# Patient Record
(demographics unavailable — no encounter records)

---

## 2022-07-14 LAB
APPEARANCE: CLEAR
BASOPHILS # BLD AUTO: 0 X10E3/MCL (ref 0–0.2)
BASOPHILS NFR BLD AUTO: 0.2 % (ref 0–2)
BILIRUBIN, URINE, POC: NEGATIVE
BLOOD URINE, POC: NEGATIVE
EOSINOPHIL # BLD AUTO: 0.1 X10E3/MCL (ref 0–0.5)
EOSINOPHIL NFR BLD AUTO: 1.3 % (ref 0–7)
ERYTHROCYTE [DISTWIDTH] IN BLOOD BY AUTOMATED COUNT: 13 % (ref 11–16)
GLUCOSE URINE, POC: NEGATIVE MG/DL
HCT VFR BLD AUTO: 40.5 % (ref 34–47)
HGB BLD-MCNC: 13.3 G/DL (ref 11.5–15.7)
IMMATURE GRANS (ABS): 0.04 X10E3/MCL (ref 0–0.06)
IMMATURE GRANULOCYTES: 0.5 % (ref 0–0.6)
KETONES, URINE, POC: NEGATIVE MG/DL
LEUKOCYTE EST, POC: NEGATIVE
LYMPHOCYTES # SPEC AUTO: 1.6 X10E3/MCL (ref 1–3.2)
LYMPHOCYTES NFR BLD AUTO: 18.7 % (ref 15–45)
MCH RBC QN AUTO: 30.6 PG (ref 27–34.5)
MCHC RBC AUTO-ENTMCNC: 32.8 G/DL (ref 32–36)
MCV RBC AUTO: 93.3 FL (ref 81–99)
MONOCYTES # BLD AUTO: 0.5 X10E3/MCL (ref 0.3–1)
MONOCYTES NFR BLD AUTO: 5.8 % (ref 4–12)
NEUTROPHILS # BLD AUTO: 6.4 X10E3/MCL (ref 1.6–7.3)
NEUTROPHILS NFR BLD AUTO: 73.5 % (ref 42–74)
NITRATE, URINE POC: NEGATIVE
PH, URINE, POC: 5.5 (ref 4.5–8)
PLATELET # BLD AUTO: 277 X10E3/MCL (ref 140–440)
PMV BLD AUTO: 10.5 FL (ref 7.2–13.2)
PREGNANCY TEST URINE, POC: NEGATIVE
PROTEIN,URINE, POC: NEGATIVE
RBC # BLD AUTO: 4.34 X10E6/MCL (ref 3.6–5.2)
SPECIFIC GRAVITY, URINE, POC: >=1.03 (ref 1–1.03)
URINALYSIS COLOR, POC: YELLOW
UROBILIN U POC: 0.2 EU/DL
WBC # BLD AUTO: 8.7 X10E3/MCL (ref 3.8–10.6)

## 2022-10-17 NOTE — ED NOTES
ED Patient Summary       ;          [facility] [place]  [address], [address]  [phone]  Discharge Instructions (Patient)  Marlon Jenkins  :  1999                   MRN: [id number]                   FIN: [id number]  Reason For Visit: Nausea; N/V  Final Diagnosis: Anemia; Viral syndrome     Visit Date: 2022 09:43:00  [place] [address] [place] [id number]  Phone: [phone]     Emergency Department Providers:         Primary Physician:      Emely Harrell would like to thank you for allowing us to assist you with your healthcare needs. The following includes patient education materials and information regarding your injury/illness. Follow-up Instructions: You were seen today on an emergency basis. Please contact your primary care doctor for a follow up appointment. If you received a referral to a specialist doctor, it is important you follow-up as instructed. It is important that you call your follow-up doctor to schedule and confirm the location of your next appointment. Your doctor may practice at multiple locations. The office location of your follow-up appointment may be different to the one written on your discharge instructions. If you do not have a primary care doctor, please call [phone] for help in finding a [facility]. [facility] Provider. For help in finding a specialist doctor, please call [phone]. If your condition gets worse before your follow-up with your primary care doctor or specialist, please return to the Emergency Department. Coronavirus 2019 (COVID-19) Reminders:     Patients age 15 - 24, with parental consent, and over age 25 can make an appointment for a COVID-19 vaccine. Patients can contact their [facility] doctors' offices to schedule an appointment to receive the COVID-19 vaccine.  Patients who do not have a [facility] physician can call [phone] to schedule vaccination appointments. Follow Up Appointments:  Primary Care Provider:     Name: PCP,  NONE     Phone:                  With: Address: When:   Follow up with primary care provider  Within 1 week   Comments:   Return to ED if symptoms worsen              [address] SERVICES%>             New Medications  Printed Prescriptions  ondansetron (Zofran 4 mg oral tablet) 1 Tabs Oral (given by mouth) every 4 hours as needed nausea for 3 Days. Refills: 0. Last Dose:____________________  Medications that have not changed  Other Medications  naproxen (naproxen 500 mg oral tablet) 1 Tabs Oral (given by mouth) 2 times a day. Last Dose:____________________  tiZANidine (tiZANidine 4 mg oral tablet) 1 Tabs Oral (given by mouth) every 8 hours. Last Dose:____________________      Allergy Info: penicillin     Discharge Additional Information          Discharge Patient 07/14/22 10:46:00 EDT      Patient Education Materials:        Contusion    A contusion is a deep bruise. Contusions are the result of a blunt injury to tissues and muscle fibers under the skin. The injury causes bleeding under the skin. The skin overlying the contusion may turn blue, purple, or yellow. Minor injuries will give you a painless contusion, but more severe injuries cause contusions that may stay painful and swollen for a few weeks. Follow these instructions at home:    Pay attention to any changes in your symptoms. Let your health care provider know about them. Take these actions to relieve your pain. Managing pain, stiffness, and swelling       Use resting, icing, applying pressure (compression), and raising (elevating) the injured area. This is often called the RICE strategy. ? Rest the injured area. Return to your normal activities as told by your health care provider.  Ask your health care provider what activities are safe for you.    ? If directed, put ice on the injured area:  ? Put ice in a plastic bag.    ? Place a towel between your skin and the bag.    ? Leave the ice on for 20 minutes, 2?3 times per day. ? If directed, apply light compression to the injured area using an elastic bandage. Make sure the bandage is not wrapped too tightly. Remove and reapply the bandage as directed by your health care provider. ? If possible, raise (elevate) the injured area above the level of your heart while you are sitting or lying down. General instructions     Take over-the-counter and prescription medicines only as told by your health care provider. Keep all follow-up visits as told by your health care provider. This is important. Contact a health care provider if:     Your symptoms do not improve after several days of treatment. Your symptoms get worse. You have difficulty moving the injured area. Get help right away if:     You have severe pain. You have numbness in a hand or foot. Your hand or foot turns pale or cold. Summary     A contusion is a deep bruise. Contusions are the result of a blunt injury to tissues and muscle fibers under the skin. It is treated with rest, ice, compression, and elevation. You may be given over-the-counter medicines for pain. Contact a health care provider if your symptoms do not improve, or get worse. Get help right away if you have severe pain, have numbness, or the area turns pale or cold. This information is not intended to replace advice given to you by your health care provider. Make sure you discuss any questions you have with your health care provider. Document Revised: 08/08/2019 Document Reviewed: 08/08/2019  Simone Patient Education ?  [address] [facility]      ---------------------------------------------------------------------------------------------------------------------  [facility] allows patients to review your COVID and other test results as well as discharge documents from any [facility]. [facility], Emergency Department, surgical center or outpatient lab. Test results are typically available 36 hours after the test is completed. [address] encourages you to self-enroll in the [facility] Patient Portal.     To begin your self-enrollment process, please visit [email or web address] Under [facility], click on Sign up now. NOTE: You must be 16 years and older to use [facility] Self-Enroll online. If you are a parent, caregiver, or guardian; you need an invite to access your childs or dependents health records. To obtain an invite, contact the Medical Records department at [phone] Monday through Friday, 8-4:30, select option 3 . If we receive your call afterhours, we will return your call the next business day. If you have issues trying to create or access your account, contact [employer] at [phone] available 7 days a week 24 hours a day.      Comment:

## 2022-10-17 NOTE — ED NOTES
9/18/2020 ; Life Cycle Status:   Active ; Vocabulary:   SNOMED CT          Diagnoses(Active)    Nausea  Date:   7/14/2022 ; Diagnosis Type:   Reason For Visit ; Confirmation:   Complaint of ; Clinical Dx:   Nausea ; Classification:   Medical ; Clinical Service:   Non-Specified ; Code:   PNED ; Probability:   0 ; Diagnosis Code:   [id number]             -    Procedure History   (As Of: 7/14/2022 10:16:25 EDT)     [facility] Fall Risk Assessment Tool   Hx of falling last 3 months ED Fall :   No   Patient confused or disoriented ED Fall :   No   Patient intoxicated or sedated ED Fall :   No   Patient impaired gait ED Fall :   No   Use a mobility assistance device ED Fall :   No   Patient altered elimination ED Fall :   No   [facility] ED Fall Score :   0    [facility] - 7/14/2022 10:12 EDT   ED Advance Directive   Advance Directive :   No   Eden Alicia RN, Mikaela Ayala - 7/14/2022 10:12 EDT   Med Hx   Medication List   (As Of: 7/14/2022 10:16:25 EDT)   Home Meds    naproxen  :   naproxen ; Status:   Documented ; Ordered As Mnemonic:   naproxen 500 mg oral tablet ; Simple Display Line:   500 mg, 1 tabs, Oral, BID, 60 tabs, 0 Refill(s) ; Catalog Code:   naproxen ; Order Dt/Tm:   7/14/2022 10:16:08 EDT          tiZANidine  :   tiZANidine ; Status:   Documented ; Ordered As Mnemonic:   tiZANidine 4 mg oral tablet ; Simple Display Line:   4 mg, 1 tabs, Oral, q8hr, 90 tabs, 0 Refill(s) ;  Catalog Code:   tiZANidine ; Order Dt/Tm:   7/14/2022 10:16:08 EDT

## 2022-10-17 NOTE — DISCHARGE SUMMARY
ED Clinical Summary                         [facility]  [address], [id number] [phone]           PERSON INFORMATION  Name: Angelica Dangelo Age:  25 Years : 1999   Sex: Female Language: English PCP: PCP,  NONE   Marital Status:  Single Phone: [phone] Med Service: MED-Medicine   MRN:  [id number] Acct# [de-identified] Arrival: 2022 09:43:00   Visit Reason: Nausea; N/V Acuity: 3 LOS: 000 01:15   Address:      [address] [place] [address],[address] [id number]  Diagnosis:      Anemia; Viral syndrome  Printed Prescriptions: Allergies      penicillin (Rash)      Medications Administered During Visit:        Patient Medication List:              naproxen (naproxen 500 mg oral tablet) 1 Tabs Oral (given by mouth) 2 times a day. ondansetron (Zofran 4 mg oral tablet) 1 Tabs Oral (given by mouth) every 4 hours as needed nausea for 3 Days. Refills: 0.  tiZANidine (tiZANidine 4 mg oral tablet) 1 Tabs Oral (given by mouth) every 8 hours. Major Tests and Procedures: The following procedures and tests were performed during your ED visit. COMMONPROCEDURES%>  COMMON PROCEDURESCOMMENTS%>          Laboratory Orders  Name Status Details   . Preg U POC Completed Urine, RT, RT - Routine, Collected, 22 10:14:00 EDT, Nurse collect, 22 10:14:00 /[facility] POC Login   . UA POC Completed Urine, RT, RT - Routine, Collected, 22 10:16:00 EDT, Nurse collect, 22 10:16:00 /[place] POC Login   CBCDIFF Completed Blood, Stat, ST - Stat, 22 10:20:00 EDT, 22 10:20:00 EDT, Nurse collect, EVIE GALO, Print label Y/N               Radiology Orders  No radiology orders were placed.               Patient Care Orders  Name Status Details   Discharge Patient Ordered 22 10:46:00 EDT   ED Assessment Adult Completed 22 9:54:31 EDT, 22 9:54:31 EDT   ED Secondary Triage Completed 22 9:54:31 EDT, 07/14/22 9:54:31 EDT   ED Triage Adult Completed 07/14/22 9:43:49 EDT, 07/14/22 9:43:49 EDT   POC-Urine Dipstick collect Completed 07/14/22 9:49:00 EDT, Once, 07/14/22 9:49:00 EDT   POC-Urine Pregnancy Test collect Completed 07/14/22 9:49:00 EDT, Once, 07/14/22 9:49:00 EDT             PROVIDER INFORMATION               Provider Role Assigned Tish Rowe ED Provider 7/14/2022 09:44:51    Michaela Powell, RN, Jose Bartlett ED Nurse 7/14/2022 10:12:17        Attending Physician:  Jsoselyn Tomlin     Admit Doc  EVIE GALO     Consulting Doc       VITALS INFORMATION  Vital Sign Triage Latest   Temp Oral ORAL_1%>36.9 degC ORAL%>36.9 degC   Temp Temporal TEMPORAL_1%> TEMPORAL%>   Temp Intravascular INTRAVASCULAR_1%> INTRAVASCULAR%>   Temp Axillary AXILLARY_1%> AXILLARY%>   Temp Rectal RECTAL_1%> RECTAL%>   02 Sat 99 % 99 %   Respiratory Rate RATE_1%>16 br/min RATE%>16 br/min   Peripheral Pulse Rate PULSE RATE_1%> PULSE RATE%>   Apical Heart Rate HEART RATE_1%> HEART RATE%>   Blood Pressure BLOOD PRESSURE_1%>/ BLOOD PRESSURE_1%>75 mmHg BLOOD PRESSURE%>134 mmHg / BLOOD PRESSURE%>70 mmHg                 Immunizations      No Immunizations Documented This Visit          DISCHARGE INFORMATION   Discharge Disposition: H Outpt-Sent Home   Discharge Location:    Home   Discharge Date and Time:    7/14/2022 10:58:29   ED Checkout Date and Time:    7/14/2022 10:58:29     DEPART REASON INCOMPLETE INFORMATION               Depart Action Incomplete Reason   Interactive View/I&O Recently assessed               Problems      Active           Rh negative          Body mass index 30+ - obesity (09/18/2020)              Smoking Status      Former smoker, quit more than 30 days ago         PATIENT EDUCATION INFORMATION  Instructions:       Contusion     Follow up:                    With: Address: When:   Follow up with primary care provider  Within 1 week   Comments:   Return to ED if symptoms worsen           ED PROVIDER DOCUMENTATION

## 2022-10-17 NOTE — ED NOTES
ED Patient Education Note     Patient Education Materials Follows:  Orthopedics     Contusion    A contusion is a deep bruise. Contusions are the result of a blunt injury to tissues and muscle fibers under the skin. The injury causes bleeding under the skin. The skin overlying the contusion may turn blue, purple, or yellow. Minor injuries will give you a painless contusion, but more severe injuries cause contusions that may stay painful and swollen for a few weeks. Follow these instructions at home:    Pay attention to any changes in your symptoms. Let your health care provider know about them. Take these actions to relieve your pain. Managing pain, stiffness, and swelling       Use resting, icing, applying pressure (compression), and raising (elevating) the injured area. This is often called the RICE strategy. ? Rest the injured area. Return to your normal activities as told by your health care provider. Ask your health care provider what activities are safe for you.    ? If directed, put ice on the injured area:  ? Put ice in a plastic bag.    ? Place a towel between your skin and the bag.    ? Leave the ice on for 20 minutes, 2?3 times per day. ? If directed, apply light compression to the injured area using an elastic bandage. Make sure the bandage is not wrapped too tightly. Remove and reapply the bandage as directed by your health care provider. ? If possible, raise (elevate) the injured area above the level of your heart while you are sitting or lying down. General instructions     Take over-the-counter and prescription medicines only as told by your health care provider. Keep all follow-up visits as told by your health care provider. This is important. Contact a health care provider if:     Your symptoms do not improve after several days of treatment. Your symptoms get worse. You have difficulty moving the injured area.       Get help right away if:     You have severe pain. You have numbness in a hand or foot. Your hand or foot turns pale or cold. Summary     A contusion is a deep bruise. Contusions are the result of a blunt injury to tissues and muscle fibers under the skin. It is treated with rest, ice, compression, and elevation. You may be given over-the-counter medicines for pain. Contact a health care provider if your symptoms do not improve, or get worse. Get help right away if you have severe pain, have numbness, or the area turns pale or cold. This information is not intended to replace advice given to you by your health care provider. Make sure you discuss any questions you have with your health care provider. Document Revised: 08/08/2019 Document Reviewed: 08/08/2019  Elsevier Patient Education ?  [address].

## 2022-10-17 NOTE — ED PROVIDER NOTES
General Medical Problem *ED        Patient:   Huang Munoz             MRN: [id number]            FIN: [id number]               Age:   25 years     Sex:  Female     :  1999   Associated Diagnoses:   Viral syndrome; Anemia   Author:   Santa Weston      Basic Information   Time seen: Provider Seen (ST)   ED Provider/Time:    IRAJ  COLLEEN- / 2022 09:44  . Additional information: Chief Complaint from Nursing Triage Note   Chief Complaint  Chief Complaint: Reports c/o NV x 1 wk, states has not vomited sinc eMN, denies diarrhea, unsure of pregnancy. Afebrile. c/o intermittent lower abd/pelvic pain, currently pain free. Denies vag bldg, c/o increased white PV d/c. (22 09:50:00). History of Present Illness   15-year-old female with no medical history presents over concerns of possible pregnancy. Last menstrual period was about 6 weeks ago notes mild nausea no abdominal pain no vaginal bleeding or discharge no fever chills biotics otherwise mostly recovered from a viral URI which was very nonspecific and very mild in nature has since had no further concerns or issues otherwise well nonprogressive. Review of Systems   Constitutional symptoms:  No fever, no chills, no sweats, no generalized weakness, no fatigue, no decreased activity. Skin symptoms:  No jaundice, no rash. Eye symptoms:  Negative except as documented in HPI. ENMT symptoms:  Negative except as documented in HPI. Respiratory symptoms:  Negative except as documented in HPI. Cardiovascular symptoms:  No chest pain, no palpitations, no tachycardia, no syncope, no diaphoresis. Gastrointestinal symptoms:  Nausea, no abdominal pain, no vomiting, no diarrhea. Genitourinary symptoms:  Negative except as documented in HPI. Musculoskeletal symptoms:  No back pain, no Muscle pain, no Joint pain.     Neurologic symptoms:  No headache, no dizziness, no altered level of consciousness, no numbness, no tingling, no focal weakness. Psychiatric symptoms:  No anxiety,              Additional review of systems information: All other systems reviewed and otherwise negative. Health Status   Allergies: Allergic Reactions (Selected)  Mild  Penicillin- Rash. .      Past Medical/ Family/ Social History   Medical history: Reviewed as documented in chart. Surgical history: Reviewed as documented in chart. Family history: Not significant. Social history: Reviewed as documented in chart. Problem list:    Active Problems (4)  Anxiety   Asthma   Body mass index 30+ - obesity   Rh negative   . Physical Examination               Vital Signs   Vital Signs   2/69/2021 1:75 EDT Systolic Blood Pressure 455 mmHg    Diastolic Blood Pressure 75 mmHg    Temperature Oral 36.9 degC    Heart Rate Monitored 90 bpm    Respiratory Rate 16 br/min    SpO2 99 %   . Measurements   7/14/2022 9:54 EDT Body Mass Index est brett 28.51 kg/m2    Body Mass Index Measured 28.51 kg/m2   7/14/2022 9:50 EDT Height/Length Measured 151 cm    Weight Dosing 65 kg   . Basic Oxygen Information   7/14/2022 10:17 EDT Oxygen Therapy Room air   7/14/2022 9:50 EDT Oxygen Therapy Room air    SpO2 99 %   . General:  Alert, no acute distress. [place] coma scale: Total score: Total score: 15.   Neurological:  Alert and oriented to person, place, time, and situation, No focal neurological deficit observed, normal sensory observed, normal motor observed. Skin:  Warm. Head:  Normocephalic, atraumatic. Neck:  Supple, trachea midline. Eye:  Normal conjunctiva. Ears, nose, mouth and throat:  Tympanic membranes clear, oral mucosa moist, no pharyngeal erythema or exudate. Cardiovascular:  No edema. Respiratory:  Respirations are non-labored. Chest wall:  No tenderness. Back:  Nontender, Normal range of motion. Musculoskeletal:  Normal ROM. Gastrointestinal:  Soft, Nontender. Genitourinary   Psychiatric:  Cooperative.       Medical Decision Making   Results review:  Lab results : Lab View   7/14/2022 10:16 EDT Appear U POC Clear    Color U POC Yellow    Bili U POC Negative    Blood U POC Negative    Glucose U POC Negative mg/dL    Ketones U POC Negative mg/dL    Leuk Est U POC Negative    Nitrite U POC Negative    pH U POC 5.5    Protein U POC Negative    Spec Grav U POC >=1.030    Urobilin U POC 0.2 EU/dL   7/14/2022 10:14 EDT Preg U POC Negative   7/14/2022 9:54 EDT Estimated Creatinine Clearance 91.49 mL/min   , Lab results : Lab View   7/14/2022 10:25 EDT WBC 8.7 x10e3/mcL    RBC 4.34 x10e6/mcL    Hgb 13.3 g/dL    HCT 40.5 %    MCV 93.3 fL    MCH 30.6 pg    MCHC 32.8 g/dL    RDW 13.0 %    Platelet 213 [id number]/XCD    MPV 10.5 fL    Neutro Auto 73.5 %    Neutro Absolute 6.4 x10e3/mcL    Immature Grans Percent 0.5 %    Immature Grans Absolute 0.04 x10e3/mcL    Lymph Auto 18.7 %    Lymph Absolute 1.6 x10e3/mcL    Mono Auto 5.8 %    Mono Absolute 0.5 x10e3/mcL    Eosinophil Percent 1.3 %    Eos Absolute 0.1 x10e3/mcL    Basophil Auto 0.2 %    Baso Absolute 0.0 x10e3/mcL   7/14/2022 10:16 EDT Appear U POC Clear    Color U POC Yellow    Bili U POC Negative    Blood U POC Negative    Glucose U POC Negative mg/dL    Ketones U POC Negative mg/dL    Leuk Est U POC Negative    Nitrite U POC Negative    pH U POC 5.5    Protein U POC Negative    Spec Grav U POC >=1.030    Urobilin U POC 0.2 EU/dL   7/14/2022 10:14 EDT Preg U POC Negative   7/14/2022 9:54 EDT Estimated Creatinine Clearance 91.49 mL/min   , Interpretation. Reexamination/ Reevaluation   Course: improving. Pain status: decreased. Assessment: exam improved. Impression and Plan   Diagnosis   Viral syndrome ([id number] B34.9, Discharge, Medical)   Anemia (VRX38-CE D64.9, Discharge, Medical)   Plan   Condition: Improved, Stable. Disposition: Discharged: to home. Patient was given the following educational materials: Contusion. Follow up with:  Follow up with primary care

## 2022-10-17 NOTE — ED NOTES
ED Triage Note       ED Triage Adult Entered On:  7/14/2022 9:54 EDT    Performed On:  7/14/2022 9:50 EDT by Zara Mcdonald RN, [facility]   Numeric Rating Pain Scale :   0 = No pain   Chief Complaint :   Reports c/o NV x 1 wk, states has not vomited sinc eMN, denies diarrhea, unsure of pregnancy. Afebrile. c/o intermittent lower abd/pelvic pain, currently pain free. Denies vag bldg, c/o increased white PV d/c.     [place] ([facility]) Mode of Arrival :   Private vehicle   Infectious Disease Documentation :   Document assessment   Patient received chemo or biotherapy last 48 hrs? :   No   Temperature Oral :   36.9 degC(Converted to: 98.4 degF)    Heart Rate Monitored :   90 bpm   Respiratory Rate :   16 br/min   Systolic Blood Pressure :   074 mmHg   Diastolic Blood Pressure :   75 mmHg   SpO2 :   99 %   Oxygen Therapy :   Room air   Patient presentation :   None of the above   Chief Complaint or Presentation suggest infection :   No   Weight Dosing :   65 kg(Converted to: 143 lb 5 oz)    Height :   151 cm(Converted to: 4 ft 11 in)    Body Mass Index Dosing :   29 kg/m2   Angelia Mcdonald - 7/14/2022 9:50 EDT   DCP GENERIC CODE   Tracking Acuity :   3   Tracking Group :   ED [employer] [place]   Angelia Mcdonald - 7/14/2022 9:50 EDT   ED General Section :   Document assessment   Pregnancy Status :   Patient denies   Last Menstrual Period :   6/1/2022 EDT   ED Allergies Section :   Document assessment   ED Reason for Visit Section :   Document assessment   Everardo Bob - 7/14/2022 9:50 EDT   ID Risk Screen Symptoms   Recent Travel History :   No recent travel   Last 90 days COVID-19 ID :   No   Close Contact with COVID-19 ID :   No   Last 14 days COVID-19 ID :   No   Everardo Bob - 7/14/2022 9:50 EDT   Allergies   (As Of: 7/14/2022 09:54:30 EDT)   Allergies (Active)   penicillin  Estimated Onset Date:   Unspecified ; Reactions:   Rash ; Created By:   Andreas Olvera RN, HEATHER SNEED Reaction Status: Active ; Category:   Drug ; Substance:   penicillin ; Type: Allergy ; Severity:   Mild ; Updated By:   Beatriz Marin RN, HEATHER GARCIA; Reviewed Date:   7/14/2022 9:53 EDT        Psycho-Social   Last 3 mo, thoughts killing self/others :   Patient denies   Right click within box for Suspected Abuse policy link. :   None   Feels Safe Where Live :   Yes   ED Behavioral Activity Rating Scale :   4 - Quiet and awake (normal level of activity)   Jeffrey Vásquez - 7/14/2022 9:50 EDT   ED Reason for Visit   (As Of: 7/14/2022 09:54:30 EDT)   Problems(Active)    Anxiety (SNOMED CT  :[id number] )  Name of Problem:   Anxiety ; Recorder:   Elizabeth Brady; Confirmation:   Confirmed ; Classification:   Patient Stated ; Code:   [id number] ; Contributor System:   [employer] ; Last Updated:   12/18/2019 21:03 EST ; Life Cycle Date:   12/18/2019 ; Life Cycle Status:   Active ; Vocabulary:   SNOMED CT        Asthma (SNOMED CT  :067423913 )  Name of Problem:   Asthma ; Recorder:   Elizabeth Brady; Confirmation:   Confirmed ; Classification:   Patient Stated ; Code:   [id number] ; Contributor System:   [employer] ; Last Updated:   12/18/2019 21:03 EST ; Life Cycle Date:   12/18/2019 ; Life Cycle Status:   Active ; Vocabulary:   SNOMED CT        Body mass index 30+ - obesity (SNOMED CT  :[id number] )  Name of Problem: Body mass index 30+ - obesity ; Onset Date:   9/18/2020 ; Recorder:   SYSTEM,  SYSTEM; Confirmation:   Confirmed ; Classification:   Medical ; Code:   [id number] ; Last Updated:   9/18/2020 11:06 EDT ; Life Cycle Date:   9/18/2020 ; Life Cycle Status:   Active ; Vocabulary:   SNOMED CT   ; Comments:        9/18/2020 11:06 - SYSTEM,  SYSTEM  Problem added by Discern Expert      Rh negative (SNOMED CT  :[id number] )  Name of Problem:   Rh negative ; Recorder:   SYSTEM,  SYSTEM; Confirmation:   Confirmed ; Classification:   Medical ; Code:   [id number] ; Last Updated:   9/18/2020 11:06 EDT ;  Life Cycle Date:   9/18/2020 ; Life Cycle Status:   Active ; Vocabulary:   SNOMED CT          Diagnoses(Active)    Nausea  Date:   7/14/2022 ; Diagnosis Type:   Reason For Visit ; Confirmation:   Complaint of ; Clinical Dx:   Nausea ; Classification:   Medical ; Clinical Service:   Non-Specified ; Code:   PNED ; Probability:   0 ; Diagnosis Code:   [id number]